# Patient Record
Sex: MALE | Race: WHITE | URBAN - METROPOLITAN AREA
[De-identification: names, ages, dates, MRNs, and addresses within clinical notes are randomized per-mention and may not be internally consistent; named-entity substitution may affect disease eponyms.]

---

## 2020-09-30 ENCOUNTER — OFFICE VISIT (OUTPATIENT)
Dept: URBAN - METROPOLITAN AREA CLINIC 54 | Facility: CLINIC | Age: 70
End: 2020-09-30
Payer: COMMERCIAL

## 2020-09-30 DIAGNOSIS — K21.9 GERD (GASTROESOPHAGEAL REFLUX DISEASE): ICD-10-CM

## 2020-09-30 PROCEDURE — G8420 CALC BMI NORM PARAMETERS: HCPCS | Performed by: INTERNAL MEDICINE

## 2020-09-30 PROCEDURE — 1036F TOBACCO NON-USER: CPT | Performed by: INTERNAL MEDICINE

## 2020-09-30 PROCEDURE — G8427 DOCREV CUR MEDS BY ELIG CLIN: HCPCS | Performed by: INTERNAL MEDICINE

## 2020-09-30 PROCEDURE — 3017F COLORECTAL CA SCREEN DOC REV: CPT | Performed by: INTERNAL MEDICINE

## 2020-09-30 PROCEDURE — 99203 OFFICE O/P NEW LOW 30 MIN: CPT | Performed by: INTERNAL MEDICINE

## 2020-09-30 RX ORDER — LISINOPRIL 5 MG/1
1 TABLET TABLET ORAL ONCE A DAY
Status: ACTIVE | COMMUNITY

## 2020-09-30 RX ORDER — DEXLANSOPRAZOLE 60 MG/1
1 CAPSULE CAPSULE, DELAYED RELEASE ORAL ONCE A DAY
Status: DISCONTINUED | COMMUNITY

## 2020-09-30 RX ORDER — PANTOPRAZOLE SODIUM 40 MG/1
1 TABLET TABLET, DELAYED RELEASE ORAL BID
Qty: 60 TABLET | Status: ACTIVE | COMMUNITY

## 2020-09-30 RX ORDER — FUROSEMIDE 20 MG/1
1 TABLET TABLET ORAL ONCE A DAY
Status: DISCONTINUED | COMMUNITY

## 2020-09-30 RX ORDER — ONDANSETRON HYDROCHLORIDE 8 MG/1
1 TABLET AS NEEDED TABLET, FILM COATED ORAL ONCE A DAY
Status: DISCONTINUED | COMMUNITY

## 2020-09-30 RX ORDER — ATORVASTATIN CALCIUM 20 MG/1
1 TABLET TABLET, FILM COATED ORAL ONCE A DAY
Status: ACTIVE | COMMUNITY

## 2020-09-30 RX ORDER — LEVOTHYROXINE SODIUM 0.1 MG/1
1 TABLET IN THE MORNING ON AN EMPTY STOMACH TABLET ORAL ONCE A DAY
Qty: 30 TABLET | Status: ACTIVE | COMMUNITY

## 2020-09-30 RX ORDER — PONATINIB HYDROCHLORIDE 45 MG/1
1 TABLET TABLET, FILM COATED ORAL ONCE A DAY
Status: ACTIVE | COMMUNITY

## 2020-09-30 RX ORDER — METOPROLOL TARTRATE 25 MG/1
1 TABLET WITH FOOD TABLET, FILM COATED ORAL TWICE A DAY
Status: ACTIVE | COMMUNITY

## 2020-09-30 RX ORDER — MIRTAZAPINE 15 MG/1
1 TABLET AT BEDTIME TABLET, FILM COATED ORAL ONCE A DAY
Status: ACTIVE | COMMUNITY

## 2020-09-30 RX ORDER — FAMOTIDINE 20 MG/1
1 TABLET AT BEDTIME AS NEEDED TABLET, FILM COATED ORAL ONCE A DAY
Status: DISCONTINUED | COMMUNITY

## 2020-09-30 RX ORDER — FEXOFENADINE HYDROCHLORIDE 180 MG/1
1 TABLET TABLET, FILM COATED ORAL ONCE A DAY
Status: DISCONTINUED | COMMUNITY

## 2020-09-30 NOTE — PHYSICAL EXAM SKIN:
no rashes , no suspicious lesions , no areas of discoloration , no jaundice present , good turgor , no masses , no tenderness on palpation. Dry scaly skin throughout , sees dermatology

## 2020-09-30 NOTE — HPI-TODAY'S VISIT:
This is a 70-year-old male referred by Dr. Hernando Perdomo for reflux.  He has had several months of nighttime regurgitation sometimes with liquid on the pillow he awakens with coughing as a result.  He has not suffered aspiration pneumonia.  He is unaware of heartburn occurring during the day.  For the last several months he has been on pantoprazole twice a day.  There is no abdominal pain.  He has mild constipation for which he takes milk of magnesia.  There is been no GI bleeding or loss of weight.  Is been no excessive gaining weight.  He uses only a small amount of coffee, he does not use peppermints.  There is no dysphagia.  He has had no recent endoscopic exams.  He had a fundoplication in 1998 with good results.  He has never had a colonoscopy and refuses one now. He has CML and is on regular chemotherapy for this keeping the WBC fairly low currently 3200 with 50% neutrophils.  He has mild thrombocytopenia with 100,000 platelets.  He has no active cardiac or respiratory issues.

## 2020-10-12 ENCOUNTER — CLAIMS CREATED FROM THE CLAIM WINDOW (OUTPATIENT)
Dept: URBAN - METROPOLITAN AREA CLINIC 4 | Facility: CLINIC | Age: 70
End: 2020-10-12
Payer: COMMERCIAL

## 2020-10-12 ENCOUNTER — OFFICE VISIT (OUTPATIENT)
Dept: URBAN - METROPOLITAN AREA SURGERY CENTER 14 | Facility: SURGERY CENTER | Age: 70
End: 2020-10-12
Payer: COMMERCIAL

## 2020-10-12 DIAGNOSIS — R12 BURNING REFLUX: ICD-10-CM

## 2020-10-12 DIAGNOSIS — K29.60 OTHER GASTRITIS WITHOUT BLEEDING: ICD-10-CM

## 2020-10-12 DIAGNOSIS — K31.89 ACQUIRED DEFORMITY OF DUODENUM: ICD-10-CM

## 2020-10-12 PROCEDURE — G8907 PT DOC NO EVENTS ON DISCHARG: HCPCS | Performed by: INTERNAL MEDICINE

## 2020-10-12 PROCEDURE — 88305 TISSUE EXAM BY PATHOLOGIST: CPT | Performed by: PATHOLOGY

## 2020-10-12 PROCEDURE — 43239 EGD BIOPSY SINGLE/MULTIPLE: CPT | Performed by: INTERNAL MEDICINE

## 2020-10-12 PROCEDURE — 88312 SPECIAL STAINS GROUP 1: CPT | Performed by: PATHOLOGY

## 2020-11-18 ENCOUNTER — OFFICE VISIT (OUTPATIENT)
Dept: URBAN - METROPOLITAN AREA CLINIC 54 | Facility: CLINIC | Age: 70
End: 2020-11-18

## 2020-11-30 ENCOUNTER — OFFICE VISIT (OUTPATIENT)
Dept: URBAN - METROPOLITAN AREA CLINIC 54 | Facility: CLINIC | Age: 70
End: 2020-11-30
Payer: COMMERCIAL

## 2020-11-30 DIAGNOSIS — R93.5 ABNORMAL CT OF THE ABDOMEN: ICD-10-CM

## 2020-11-30 PROCEDURE — G9903 PT SCRN TBCO ID AS NON USER: HCPCS | Performed by: INTERNAL MEDICINE

## 2020-11-30 PROCEDURE — 3017F COLORECTAL CA SCREEN DOC REV: CPT | Performed by: INTERNAL MEDICINE

## 2020-11-30 PROCEDURE — G8427 DOCREV CUR MEDS BY ELIG CLIN: HCPCS | Performed by: INTERNAL MEDICINE

## 2020-11-30 PROCEDURE — G8420 CALC BMI NORM PARAMETERS: HCPCS | Performed by: INTERNAL MEDICINE

## 2020-11-30 PROCEDURE — 99214 OFFICE O/P EST MOD 30 MIN: CPT | Performed by: INTERNAL MEDICINE

## 2020-11-30 PROCEDURE — G8482 FLU IMMUNIZE ORDER/ADMIN: HCPCS | Performed by: INTERNAL MEDICINE

## 2020-11-30 RX ORDER — POLYETHYLENE GLYCOL 3350, SODIUM SULFATE, SODIUM CHLORIDE, POTASSIUM CHLORIDE, ASCORBIC ACID, SODIUM ASCORBATE 140-9-5.2G
AS DIRECTED KIT ORAL
Qty: 1 | OUTPATIENT
Start: 2020-11-30 | End: 2020-12-01

## 2020-11-30 RX ORDER — LISINOPRIL 5 MG/1
1 TABLET TABLET ORAL ONCE A DAY
Status: ACTIVE | COMMUNITY

## 2020-11-30 RX ORDER — PONATINIB HYDROCHLORIDE 45 MG/1
1 TABLET TABLET, FILM COATED ORAL ONCE A DAY
Status: ACTIVE | COMMUNITY

## 2020-11-30 RX ORDER — ATORVASTATIN CALCIUM 20 MG/1
1 TABLET TABLET, FILM COATED ORAL ONCE A DAY
Status: ACTIVE | COMMUNITY

## 2020-11-30 RX ORDER — METOPROLOL TARTRATE 25 MG/1
1 TABLET WITH FOOD TABLET, FILM COATED ORAL TWICE A DAY
Status: ACTIVE | COMMUNITY

## 2020-11-30 RX ORDER — PANTOPRAZOLE SODIUM 40 MG/1
1 TABLET TABLET, DELAYED RELEASE ORAL BID
Qty: 60 TABLET | Status: ACTIVE | COMMUNITY

## 2020-11-30 RX ORDER — MIRTAZAPINE 15 MG/1
1 TABLET AT BEDTIME TABLET, FILM COATED ORAL ONCE A DAY
Status: ACTIVE | COMMUNITY

## 2020-11-30 RX ORDER — LEVOTHYROXINE SODIUM 0.1 MG/1
1 TABLET IN THE MORNING ON AN EMPTY STOMACH TABLET ORAL ONCE A DAY
Qty: 30 TABLET | Status: ACTIVE | COMMUNITY

## 2020-11-30 NOTE — HPI-TODAY'S VISIT:
70-year-old man seen in follow-up from recent hospitalization for typhlitis with fever.  He currently feels well he presented with fever fatigue and weakness.  He had no abdominal pain, diarrhea or rectal bleeding.  He had previously been offered colonoscopy but declined.  He would like to proceed at this time.  He currently feels well.  His chronic leukemia is under control with medication.  His reflux symptoms are under control with twice daily PPI.  He has no dysphagia.

## 2020-11-30 NOTE — HPI-OTHER HISTORIES
>>Recent hospital visit  HISTORY OF PRESENT ILLNESS: Patient is a 70-year-old male non-smoker with a past medical history of chronic myeloid leukemia (on chemo pill daily), anemia, Hypertension, Hyperlipidemia, stage 3 CKD, A-fib, and GERD who presents to the ED with complaints of a fever that began around 0630 this morning. The symptoms have been moderate (104.9 degrees) and constant since onset. Upon reviewing prior records, the patient was evaluated here yesterday for fatigue where it was noted that he was dehydrated and was given fluids. There are no alleviating or exacerbating factors. Patient reports some wheezing that began last night along with fatigue, however denies any abdominal pain, nausea, vomiting, diarrhea, cough, congestion, or chest pain.    Consults: General surgery.   Brief hospital course: Patient admitted to the hospital with the above history.  Seen by surgeon diagnosed with typhlitis. Started on antibiotic treatment as recommended by surgery. Patient started on IV fluid hydration on admission, patient had dilutional effect with resultant drop in the WBC, H&H and platelets count.  Patient was advised to follow-up with his hematologist oncologist as soon as possible within the next week

## 2021-01-25 ENCOUNTER — OFFICE VISIT (OUTPATIENT)
Dept: URBAN - METROPOLITAN AREA SURGERY CENTER 14 | Facility: SURGERY CENTER | Age: 71
End: 2021-01-25

## 2021-09-08 ENCOUNTER — TELEPHONE ENCOUNTER (OUTPATIENT)
Dept: URBAN - METROPOLITAN AREA CLINIC 54 | Facility: CLINIC | Age: 71
End: 2021-09-08

## 2021-09-20 ENCOUNTER — OFFICE VISIT (OUTPATIENT)
Dept: URBAN - METROPOLITAN AREA CLINIC 54 | Facility: CLINIC | Age: 71
End: 2021-09-20

## 2021-09-23 ENCOUNTER — OFFICE VISIT (OUTPATIENT)
Dept: URBAN - METROPOLITAN AREA CLINIC 54 | Facility: CLINIC | Age: 71
End: 2021-09-23
Payer: COMMERCIAL

## 2021-09-23 ENCOUNTER — OFFICE VISIT (OUTPATIENT)
Dept: URBAN - METROPOLITAN AREA CLINIC 54 | Facility: CLINIC | Age: 71
End: 2021-09-23

## 2021-09-23 ENCOUNTER — WEB ENCOUNTER (OUTPATIENT)
Dept: URBAN - METROPOLITAN AREA CLINIC 54 | Facility: CLINIC | Age: 71
End: 2021-09-23

## 2021-09-23 VITALS
SYSTOLIC BLOOD PRESSURE: 108 MMHG | TEMPERATURE: 97.2 F | HEART RATE: 79 BPM | WEIGHT: 149.8 LBS | HEIGHT: 66 IN | BODY MASS INDEX: 24.08 KG/M2 | DIASTOLIC BLOOD PRESSURE: 77 MMHG

## 2021-09-23 DIAGNOSIS — K21.9 GERD (GASTROESOPHAGEAL REFLUX DISEASE): ICD-10-CM

## 2021-09-23 DIAGNOSIS — R93.3 ABNORMAL CT SCAN, COLON: ICD-10-CM

## 2021-09-23 DIAGNOSIS — R19.7 DIARRHEA, UNSPECIFIED TYPE: ICD-10-CM

## 2021-09-23 PROCEDURE — 99213 OFFICE O/P EST LOW 20 MIN: CPT | Performed by: INTERNAL MEDICINE

## 2021-09-23 RX ORDER — METOPROLOL TARTRATE 25 MG/1
1 TABLET WITH FOOD TABLET, FILM COATED ORAL TWICE A DAY
Status: ACTIVE | COMMUNITY

## 2021-09-23 RX ORDER — PONATINIB HYDROCHLORIDE 45 MG/1
1 TABLET TABLET, FILM COATED ORAL ONCE A DAY
Status: ACTIVE | COMMUNITY

## 2021-09-23 RX ORDER — PANTOPRAZOLE SODIUM 40 MG/1
1 TABLET TABLET, DELAYED RELEASE ORAL BID
Qty: 60 TABLET | Status: ACTIVE | COMMUNITY

## 2021-09-23 RX ORDER — SODIUM SULFATE, MAGNESIUM SULFATE, AND POTASSIUM CHLORIDE 17.75; 2.7; 2.25 G/1; G/1; G/1
12 TABLETS TABLET ORAL
Qty: 24 TABLET | Refills: 0 | OUTPATIENT
Start: 2021-09-23 | End: 2021-09-24

## 2021-09-23 RX ORDER — MIRTAZAPINE 15 MG/1
1 TABLET AT BEDTIME TABLET, FILM COATED ORAL ONCE A DAY
Status: ACTIVE | COMMUNITY

## 2021-09-23 RX ORDER — ATORVASTATIN CALCIUM 20 MG/1
1 TABLET TABLET, FILM COATED ORAL ONCE A DAY
Status: ACTIVE | COMMUNITY

## 2021-09-23 RX ORDER — LISINOPRIL 5 MG/1
1 TABLET TABLET ORAL ONCE A DAY
Status: ACTIVE | COMMUNITY

## 2021-09-23 RX ORDER — LEVOTHYROXINE SODIUM 0.1 MG/1
1 TABLET IN THE MORNING ON AN EMPTY STOMACH TABLET ORAL ONCE A DAY
Qty: 30 TABLET | Status: ACTIVE | COMMUNITY

## 2021-09-23 NOTE — PHYSICAL EXAM GASTROINTESTINAL
Abdomen , soft, nontender, nondistended , no guarding or rigidity , no masses palpable , normal bowel sounds , Liver and Spleen , no hepatomegaly present , no hepatosplenomegaly , liver nontender , spleen not palpable , Rectal , external exam only. Diffuse perianal erythema, no mass or ulceration.

## 2021-09-23 NOTE — HPI-TODAY'S VISIT:
71-year-old male with recent diarrhea for 3 weeks which has resolved.  There is no abdominal pain or nausea.  He is on Iclusig (a tyrosine kinase inhibitor) for CML.  He has periodic leukopenia.  A recent stool C. difficile was negative.  He had been given metronidazole on 1 of 2 recent ER visits.  He has now had no bowel movements in several days.  A CT scan that showed some persistent inflammation in the cecum and periappendiceal area.  He feels that he is not well enough to undergo colonoscopy at this time.  An upper endoscopy earlier this year was unremarkable.  He has an appointment with his hematologist again tomorrow.  He has flaky skin and significant perianal irritation from diarrhea.  His weight is remained about the same.  He feels very weak.

## 2021-09-29 LAB
ADENOVIRUS F 40/41: NOT DETECTED
ASTROVIRUS: NOT DETECTED
C DIFFICILE TOXIN A/B: NOT DETECTED
CAMPYLOBACTER: NOT DETECTED
CRYPTOSPORIDIUM: NOT DETECTED
CYCLOSPORA CAYETANENSIS: NOT DETECTED
E COLI O157: (no result)
ENTAMOEBA HISTOLYTICA: NOT DETECTED
ENTEROAGGREGATIVE E COLI: NOT DETECTED
ENTEROPATHOGENIC E COLI: NOT DETECTED
ENTEROTOXIGENIC E COLI: NOT DETECTED
GIARDIA LAMBLIA: NOT DETECTED
NOROVIRUS GI/GII: NOT DETECTED
PLESIOMONAS SHIGELLOIDES: NOT DETECTED
ROTAVIRUS A: NOT DETECTED
SALMONELLA: NOT DETECTED
SAPOVIRUS: NOT DETECTED
SHIGA-TOXIN-PRODUCING E COLI: NOT DETECTED
SHIGELLA/ENTEROINVASIVE E COLI: NOT DETECTED
VIBRIO CHOLERAE: NOT DETECTED
VIBRIO: NOT DETECTED
YERSINIA ENTEROCOLITICA: NOT DETECTED

## 2021-09-30 ENCOUNTER — TELEPHONE ENCOUNTER (OUTPATIENT)
Dept: URBAN - METROPOLITAN AREA CLINIC 54 | Facility: CLINIC | Age: 71
End: 2021-09-30

## 2021-10-05 ENCOUNTER — TELEPHONE ENCOUNTER (OUTPATIENT)
Dept: URBAN - METROPOLITAN AREA CLINIC 54 | Facility: CLINIC | Age: 71
End: 2021-10-05

## 2021-10-05 RX ORDER — LEVOTHYROXINE SODIUM 0.1 MG/1
1 TABLET IN THE MORNING ON AN EMPTY STOMACH TABLET ORAL ONCE A DAY
Qty: 30 TABLET | Status: ACTIVE | COMMUNITY

## 2021-10-05 RX ORDER — PANTOPRAZOLE SODIUM 40 MG/1
1 TABLET TABLET, DELAYED RELEASE ORAL BID
Qty: 60 TABLET | Status: ACTIVE | COMMUNITY

## 2021-10-05 RX ORDER — LISINOPRIL 5 MG/1
1 TABLET TABLET ORAL ONCE A DAY
Status: ACTIVE | COMMUNITY

## 2021-10-05 RX ORDER — MIRTAZAPINE 15 MG/1
1 TABLET AT BEDTIME TABLET, FILM COATED ORAL ONCE A DAY
Status: ACTIVE | COMMUNITY

## 2021-10-05 RX ORDER — PONATINIB HYDROCHLORIDE 45 MG/1
1 TABLET TABLET, FILM COATED ORAL ONCE A DAY
Status: ACTIVE | COMMUNITY

## 2021-10-05 RX ORDER — METOPROLOL TARTRATE 25 MG/1
1 TABLET WITH FOOD TABLET, FILM COATED ORAL TWICE A DAY
Status: ACTIVE | COMMUNITY

## 2021-10-05 RX ORDER — ONDANSETRON 4 MG/1
1 TABLET ON THE TONGUE AND ALLOW TO DISSOLVE TABLET, ORALLY DISINTEGRATING ORAL TWICE A DAY
Qty: 30 | OUTPATIENT
Start: 2021-10-06

## 2021-10-05 RX ORDER — ATORVASTATIN CALCIUM 20 MG/1
1 TABLET TABLET, FILM COATED ORAL ONCE A DAY
Status: ACTIVE | COMMUNITY

## 2021-10-12 ENCOUNTER — TELEPHONE ENCOUNTER (OUTPATIENT)
Dept: URBAN - METROPOLITAN AREA CLINIC 23 | Facility: CLINIC | Age: 71
End: 2021-10-12

## 2021-10-29 ENCOUNTER — OFFICE VISIT (OUTPATIENT)
Dept: URBAN - METROPOLITAN AREA SURGERY CENTER 14 | Facility: SURGERY CENTER | Age: 71
End: 2021-10-29

## 2021-11-03 ENCOUNTER — TELEPHONE ENCOUNTER (OUTPATIENT)
Dept: URBAN - METROPOLITAN AREA CLINIC 54 | Facility: CLINIC | Age: 71
End: 2021-11-03

## 2021-11-11 ENCOUNTER — OUT OF OFFICE VISIT (OUTPATIENT)
Dept: URBAN - METROPOLITAN AREA MEDICAL CENTER 23 | Facility: MEDICAL CENTER | Age: 71
End: 2021-11-11
Payer: COMMERCIAL

## 2021-11-11 DIAGNOSIS — K74.60 ADVANCED CIRRHOSIS: ICD-10-CM

## 2021-11-11 DIAGNOSIS — I48.0 INTERMITTENT ATRIAL FIBRILLATION: ICD-10-CM

## 2021-11-11 DIAGNOSIS — I31.3 PERICARDIAL EFFUSION: ICD-10-CM

## 2021-11-11 DIAGNOSIS — R18.8 ABDOMINAL ASCITES: ICD-10-CM

## 2021-11-11 PROCEDURE — 99215 OFFICE O/P EST HI 40 MIN: CPT | Performed by: INTERNAL MEDICINE

## 2021-11-15 ENCOUNTER — LAB OUTSIDE AN ENCOUNTER (OUTPATIENT)
Dept: URBAN - METROPOLITAN AREA CLINIC 54 | Facility: CLINIC | Age: 71
End: 2021-11-15

## 2021-11-15 ENCOUNTER — OFFICE VISIT (OUTPATIENT)
Dept: URBAN - METROPOLITAN AREA CLINIC 54 | Facility: CLINIC | Age: 71
End: 2021-11-15
Payer: COMMERCIAL

## 2021-11-15 VITALS
DIASTOLIC BLOOD PRESSURE: 68 MMHG | SYSTOLIC BLOOD PRESSURE: 105 MMHG | HEIGHT: 66 IN | WEIGHT: 129.6 LBS | BODY MASS INDEX: 20.83 KG/M2 | HEART RATE: 57 BPM | TEMPERATURE: 98 F

## 2021-11-15 DIAGNOSIS — R19.7 DIARRHEA, UNSPECIFIED TYPE: ICD-10-CM

## 2021-11-15 DIAGNOSIS — R93.3 ABNORMAL CT SCAN, COLON: ICD-10-CM

## 2021-11-15 DIAGNOSIS — R18.8 OTHER ASCITES: ICD-10-CM

## 2021-11-15 DIAGNOSIS — K21.9 GERD (GASTROESOPHAGEAL REFLUX DISEASE): ICD-10-CM

## 2021-11-15 PROCEDURE — 99214 OFFICE O/P EST MOD 30 MIN: CPT | Performed by: INTERNAL MEDICINE

## 2021-11-15 RX ORDER — METOPROLOL TARTRATE 25 MG/1
1 TABLET WITH FOOD TABLET, FILM COATED ORAL TWICE A DAY
Status: ACTIVE | COMMUNITY

## 2021-11-15 RX ORDER — LEVOTHYROXINE SODIUM 0.1 MG/1
1 TABLET IN THE MORNING ON AN EMPTY STOMACH TABLET ORAL ONCE A DAY
Qty: 30 TABLET | Status: ACTIVE | COMMUNITY

## 2021-11-15 RX ORDER — LISINOPRIL 5 MG/1
1 TABLET TABLET ORAL ONCE A DAY
Status: ACTIVE | COMMUNITY

## 2021-11-15 RX ORDER — MIRTAZAPINE 15 MG/1
1 TABLET AT BEDTIME TABLET, FILM COATED ORAL ONCE A DAY
Status: ACTIVE | COMMUNITY

## 2021-11-15 RX ORDER — ONDANSETRON 4 MG/1
1 TABLET ON THE TONGUE AND ALLOW TO DISSOLVE TABLET, ORALLY DISINTEGRATING ORAL TWICE A DAY
Qty: 30 | Status: ACTIVE | COMMUNITY
Start: 2021-10-06

## 2021-11-15 RX ORDER — PONATINIB HYDROCHLORIDE 45 MG/1
1 TABLET TABLET, FILM COATED ORAL ONCE A DAY
Status: ACTIVE | COMMUNITY

## 2021-11-15 RX ORDER — ATORVASTATIN CALCIUM 20 MG/1
1 TABLET TABLET, FILM COATED ORAL ONCE A DAY
Status: ACTIVE | COMMUNITY

## 2021-11-15 RX ORDER — FUROSEMIDE 40 MG/1
1 TABLET TABLET ORAL ONCE A DAY
Status: ACTIVE | COMMUNITY

## 2021-11-15 RX ORDER — PANTOPRAZOLE SODIUM 40 MG/1
1 TABLET TABLET, DELAYED RELEASE ORAL BID
Qty: 60 TABLET | Status: ACTIVE | COMMUNITY

## 2021-11-15 RX ORDER — SPIRONOLACTONE 50 MG/1
1 TABLET TABLET, FILM COATED ORAL ONCE A DAY
Status: ACTIVE | COMMUNITY

## 2021-11-15 NOTE — HPI-TODAY'S VISIT:
71-year-old male recently discharged from hospital with new onset ascites.  He has no prior history of chronic liver disease alcohol use or hepatitis.  He has marked fatigue.  He denies abdominal pain.  There is no peripheral edema.  He has CML and has been on Iclusig daily.  He is apparently in remission at this time.  He has undergone paracentesis with a SAG ratio of greater than 1.1 and no SBP.  Antinuclear antibody and antimitochondrial antibodies and hepatitis serologies have been negative.  Iron study was normal last year.  He also presented with shortness of breath but no chest pain.  Several months ago he had an abnormal CT scan of the colon possibly typhlitis and is been treated with antibiotics.  Stool C. difficile has been negative.  He has had mild intermittent diarrhea but no bleeding.  He had an upper endoscopy earlier this year that was unremarkable with no varices he follows up regularly with a hematologist.  He has had significant problems with dermatitis that has been related to the CML and to treatment.

## 2021-11-15 NOTE — PHYSICAL EXAM CONSTITUTIONAL:
well developed, well nourished , in no acute distress , ambulating without difficulty , normal communication ability. No asterixis. Fatigued.

## 2021-11-15 NOTE — PHYSICAL EXAM GASTROINTESTINAL
Abdomen , soft, nontender, mildly distended , no guarding or rigidity , no masses palpable , normal bowel sounds , Liver and Spleen , no hepatomegaly present , no hepatosplenomegaly , liver nontender , spleen not palpable

## 2021-11-16 ENCOUNTER — TELEPHONE ENCOUNTER (OUTPATIENT)
Dept: URBAN - METROPOLITAN AREA CLINIC 54 | Facility: CLINIC | Age: 71
End: 2021-11-16

## 2021-11-22 ENCOUNTER — TELEPHONE ENCOUNTER (OUTPATIENT)
Dept: URBAN - METROPOLITAN AREA CLINIC 23 | Facility: CLINIC | Age: 71
End: 2021-11-22

## 2021-11-29 ENCOUNTER — LAB OUTSIDE AN ENCOUNTER (OUTPATIENT)
Dept: URBAN - METROPOLITAN AREA CLINIC 54 | Facility: CLINIC | Age: 71
End: 2021-11-29

## 2021-12-13 ENCOUNTER — LAB OUTSIDE AN ENCOUNTER (OUTPATIENT)
Dept: URBAN - METROPOLITAN AREA CLINIC 54 | Facility: CLINIC | Age: 71
End: 2021-12-13

## 2021-12-16 ENCOUNTER — OFFICE VISIT (OUTPATIENT)
Dept: URBAN - METROPOLITAN AREA CLINIC 54 | Facility: CLINIC | Age: 71
End: 2021-12-16

## 2021-12-27 ENCOUNTER — LAB OUTSIDE AN ENCOUNTER (OUTPATIENT)
Dept: URBAN - METROPOLITAN AREA CLINIC 54 | Facility: CLINIC | Age: 71
End: 2021-12-27

## 2022-01-03 ENCOUNTER — TELEPHONE ENCOUNTER (OUTPATIENT)
Dept: URBAN - METROPOLITAN AREA CLINIC 92 | Facility: CLINIC | Age: 72
End: 2022-01-03

## 2022-01-03 ENCOUNTER — OFFICE VISIT (OUTPATIENT)
Dept: URBAN - METROPOLITAN AREA CLINIC 54 | Facility: CLINIC | Age: 72
End: 2022-01-03
Payer: COMMERCIAL

## 2022-01-03 VITALS
TEMPERATURE: 98.3 F | BODY MASS INDEX: 18.8 KG/M2 | SYSTOLIC BLOOD PRESSURE: 135 MMHG | WEIGHT: 117 LBS | DIASTOLIC BLOOD PRESSURE: 83 MMHG | HEART RATE: 56 BPM | HEIGHT: 66 IN

## 2022-01-03 DIAGNOSIS — R18.8 OTHER ASCITES: ICD-10-CM

## 2022-01-03 DIAGNOSIS — K74.60 UNSPECIFIED CIRRHOSIS OF LIVER: ICD-10-CM

## 2022-01-03 DIAGNOSIS — E43 SEVERE PROTEIN-CALORIE MALNUTRITION: ICD-10-CM

## 2022-01-03 DIAGNOSIS — C92.10 CML (CHRONIC MYELOCYTIC LEUKEMIA): ICD-10-CM

## 2022-01-03 DIAGNOSIS — R19.7 DIARRHEA, UNSPECIFIED TYPE: ICD-10-CM

## 2022-01-03 DIAGNOSIS — K21.9 GERD (GASTROESOPHAGEAL REFLUX DISEASE): ICD-10-CM

## 2022-01-03 DIAGNOSIS — K76.9 LIVER LESION: ICD-10-CM

## 2022-01-03 DIAGNOSIS — K76.6 PORTAL HYPERTENSION: ICD-10-CM

## 2022-01-03 DIAGNOSIS — R93.3 ABNORMAL CT SCAN, COLON: ICD-10-CM

## 2022-01-03 PROCEDURE — 99214 OFFICE O/P EST MOD 30 MIN: CPT | Performed by: INTERNAL MEDICINE

## 2022-01-03 RX ORDER — PONATINIB HYDROCHLORIDE 45 MG/1
1 TABLET TABLET, FILM COATED ORAL ONCE A DAY
Status: ACTIVE | COMMUNITY

## 2022-01-03 RX ORDER — MIRTAZAPINE 15 MG/1
1 TABLET AT BEDTIME TABLET, FILM COATED ORAL ONCE A DAY
Status: ACTIVE | COMMUNITY

## 2022-01-03 RX ORDER — ATORVASTATIN CALCIUM 20 MG/1
1 TABLET TABLET, FILM COATED ORAL ONCE A DAY
Status: ACTIVE | COMMUNITY

## 2022-01-03 RX ORDER — SPIRONOLACTONE 50 MG/1
1 TABLET TABLET, FILM COATED ORAL BID
Status: ACTIVE | COMMUNITY

## 2022-01-03 RX ORDER — LISINOPRIL 5 MG/1
1 TABLET TABLET ORAL ONCE A DAY
Status: ACTIVE | COMMUNITY

## 2022-01-03 RX ORDER — METOPROLOL TARTRATE 25 MG/1
1 TABLET WITH FOOD TABLET, FILM COATED ORAL TWICE A DAY
Status: ACTIVE | COMMUNITY

## 2022-01-03 RX ORDER — LEVOTHYROXINE SODIUM 0.1 MG/1
1 TABLET IN THE MORNING ON AN EMPTY STOMACH TABLET ORAL ONCE A DAY
Qty: 30 TABLET | Status: ACTIVE | COMMUNITY

## 2022-01-03 RX ORDER — ONDANSETRON 4 MG/1
1 TABLET ON THE TONGUE AND ALLOW TO DISSOLVE TABLET, ORALLY DISINTEGRATING ORAL TWICE A DAY
Qty: 30 | Status: ACTIVE | COMMUNITY
Start: 2021-10-06

## 2022-01-03 RX ORDER — FUROSEMIDE 40 MG/1
1 TABLET TABLET ORAL ONCE A DAY
Status: ACTIVE | COMMUNITY

## 2022-01-03 RX ORDER — PANTOPRAZOLE SODIUM 40 MG/1
1 TABLET TABLET, DELAYED RELEASE ORAL BID
Qty: 60 TABLET | Status: ACTIVE | COMMUNITY

## 2022-01-03 NOTE — PHYSICAL EXAM CONSTITUTIONAL:
Chronically ill appearing, malnourished, in no acute distress , ambulating without difficulty , normal communication ability. No asterixis. fatigued.

## 2022-01-03 NOTE — HPI-TODAY'S VISIT:
71-year-old male recently discharged from hospital with new onset ascites.  He has no prior history of chronic liver disease alcohol use or hepatitis.  He has marked fatigue.  He denies abdominal pain.  There is no peripheral edema.  He has CML and has been on Iclusig daily.  He is apparently in remission at this time.  He has undergone paracentesis with a SAG ratio of greater than 1.1 and no SBP.  Antinuclear antibody and antimitochondrial antibodies and hepatitis serologies have been negative.  Iron study was normal last year.  He also presented with shortness of breath but no chest pain.  Several months ago he had an abnormal CT scan of the colon possibly typhlitis and is been treated with antibiotics.  Stool C. difficile has been negative.  He has had mild intermittent diarrhea but no bleeding.  He had an upper endoscopy earlier this year that was unremarkable with no varices he follows up regularly with a hematologist.  He has had significant problems with dermatitis that has been related to the CML and to treatment.  Follow Up 1/3/22: Patient presents for liver related opinion. He was diagnosed with Cirrhosis in 2021. He has portal hypertension manifested as ascites. No varices or PSE. He was requiring LVP's but has responded well to A100 and L40 with good results. He is on < 2 gm Na diet. He has lost 30 lbs over past 1 year. He has HLD and HTN but no other elements of metabolic syndrome. Viral markers have been negative. He is on chemo for CML. Portal duplex negative for vascular obstruction. CT shows a segment 5 poorly described lesion with capsular retraction.

## 2022-01-10 ENCOUNTER — LAB OUTSIDE AN ENCOUNTER (OUTPATIENT)
Dept: URBAN - METROPOLITAN AREA CLINIC 54 | Facility: CLINIC | Age: 72
End: 2022-01-10

## 2022-01-10 ENCOUNTER — TELEPHONE ENCOUNTER (OUTPATIENT)
Dept: URBAN - METROPOLITAN AREA CLINIC 54 | Facility: CLINIC | Age: 72
End: 2022-01-10

## 2022-01-18 ENCOUNTER — LAB OUTSIDE AN ENCOUNTER (OUTPATIENT)
Dept: URBAN - METROPOLITAN AREA CLINIC 54 | Facility: CLINIC | Age: 72
End: 2022-01-18

## 2022-01-18 LAB
CREATININE POC: 1.6
PERFORMING LAB: (no result)

## 2022-01-19 ENCOUNTER — TELEPHONE ENCOUNTER (OUTPATIENT)
Dept: URBAN - METROPOLITAN AREA CLINIC 54 | Facility: CLINIC | Age: 72
End: 2022-01-19

## 2022-01-19 ENCOUNTER — TELEPHONE ENCOUNTER (OUTPATIENT)
Dept: URBAN - METROPOLITAN AREA CLINIC 92 | Facility: CLINIC | Age: 72
End: 2022-01-19

## 2022-01-20 ENCOUNTER — LAB OUTSIDE AN ENCOUNTER (OUTPATIENT)
Dept: URBAN - METROPOLITAN AREA CLINIC 54 | Facility: CLINIC | Age: 72
End: 2022-01-20

## 2022-01-22 LAB
AFP, SERUM, TUMOR MARKER: 4.4
ALPHA 2-MACROGLOBULINS, QN: 141
ALT (SGPT) P5P: 65
APOLIPOPROTEIN A-1: 130
AST (SGOT) P5P: 38
BILIRUBIN, TOTAL: 0.2
BUN/CREATININE RATIO: 25
BUN: 34
CA 19-9: 23
CARBON DIOXIDE, TOTAL: 21
CHLORIDE: 109
CHOLESTEROL, TOTAL: 133
COMMENT:: (no result)
CREATININE: 1.34
EGFR IF AFRICN AM: 61
EGFR IF NONAFRICN AM: 53
FIBROSIS SCORE: 0.22
FIBROSIS SCORING:: (no result)
FIBROSIS STAGE: (no result)
GGT: 175
GLUCOSE, SERUM: 89
GLUCOSE: 92
HAPTOGLOBIN: 191
HEIGHT:: 66
INTERPRETATIONS:: (no result)
LIMITATIONS:: (no result)
NASH GRADE: (no result)
NASH SCORE: 0.25
NASH SCORING: (no result)
POTASSIUM: 4.3
SODIUM: 142
STEATOSIS GRADE: (no result)
STEATOSIS GRADING: (no result)
STEATOSIS SCORE: 0.69
TRIGLYCERIDES: 131
WEIGHT:: 129

## 2022-01-24 ENCOUNTER — LAB OUTSIDE AN ENCOUNTER (OUTPATIENT)
Dept: URBAN - METROPOLITAN AREA CLINIC 54 | Facility: CLINIC | Age: 72
End: 2022-01-24

## 2022-02-07 ENCOUNTER — LAB OUTSIDE AN ENCOUNTER (OUTPATIENT)
Dept: URBAN - METROPOLITAN AREA CLINIC 54 | Facility: CLINIC | Age: 72
End: 2022-02-07

## 2022-02-16 LAB
AFP, SERUM, TUMOR MARKER: 3.3
BUN/CREATININE RATIO: 18
BUN: 18
CA 19-9: 17
CARBON DIOXIDE, TOTAL: 22
CHLORIDE: 108
CREATININE: 1
EGFR IF AFRICN AM: 87
EGFR IF NONAFRICN AM: 75
GLUCOSE: 93
POTASSIUM: 4
SODIUM: 145

## 2022-02-18 ENCOUNTER — TELEPHONE ENCOUNTER (OUTPATIENT)
Dept: URBAN - METROPOLITAN AREA CLINIC 54 | Facility: CLINIC | Age: 72
End: 2022-02-18

## 2022-03-04 ENCOUNTER — OFFICE VISIT (OUTPATIENT)
Dept: URBAN - METROPOLITAN AREA CLINIC 54 | Facility: CLINIC | Age: 72
End: 2022-03-04
Payer: COMMERCIAL

## 2022-03-04 VITALS
DIASTOLIC BLOOD PRESSURE: 81 MMHG | HEART RATE: 49 BPM | SYSTOLIC BLOOD PRESSURE: 135 MMHG | TEMPERATURE: 97.2 F | HEIGHT: 66 IN | BODY MASS INDEX: 20.89 KG/M2 | WEIGHT: 130 LBS

## 2022-03-04 DIAGNOSIS — E43 SEVERE PROTEIN-CALORIE MALNUTRITION: ICD-10-CM

## 2022-03-04 DIAGNOSIS — K76.6 PORTAL HYPERTENSION: ICD-10-CM

## 2022-03-04 DIAGNOSIS — R93.3 ABNORMAL CT SCAN, COLON: ICD-10-CM

## 2022-03-04 DIAGNOSIS — R18.8 OTHER ASCITES: ICD-10-CM

## 2022-03-04 DIAGNOSIS — K21.9 GERD (GASTROESOPHAGEAL REFLUX DISEASE): ICD-10-CM

## 2022-03-04 DIAGNOSIS — C92.10 CML (CHRONIC MYELOCYTIC LEUKEMIA): ICD-10-CM

## 2022-03-04 DIAGNOSIS — K74.60 UNSPECIFIED CIRRHOSIS OF LIVER: ICD-10-CM

## 2022-03-04 DIAGNOSIS — K76.9 LIVER LESION: ICD-10-CM

## 2022-03-04 DIAGNOSIS — R19.7 DIARRHEA, UNSPECIFIED TYPE: ICD-10-CM

## 2022-03-04 PROCEDURE — 99214 OFFICE O/P EST MOD 30 MIN: CPT | Performed by: INTERNAL MEDICINE

## 2022-03-04 RX ORDER — PONATINIB HYDROCHLORIDE 45 MG/1
1 TABLET TABLET, FILM COATED ORAL ONCE A DAY
Status: ON HOLD | COMMUNITY

## 2022-03-04 RX ORDER — LISINOPRIL 5 MG/1
1 TABLET TABLET ORAL ONCE A DAY
Status: ACTIVE | COMMUNITY

## 2022-03-04 RX ORDER — FUROSEMIDE 40 MG/1
1 TABLET TABLET ORAL ONCE A DAY
Status: ACTIVE | COMMUNITY

## 2022-03-04 RX ORDER — LEVOTHYROXINE SODIUM 0.1 MG/1
1 TABLET IN THE MORNING ON AN EMPTY STOMACH TABLET ORAL ONCE A DAY
Qty: 30 TABLET | Status: ACTIVE | COMMUNITY

## 2022-03-04 RX ORDER — SPIRONOLACTONE 50 MG/1
1 TABLET TABLET, FILM COATED ORAL BID
Status: ACTIVE | COMMUNITY

## 2022-03-04 RX ORDER — FUROSEMIDE 20 MG/1
1 TABLET TABLET ORAL ONCE A DAY
Qty: 30 | Refills: 2 | OUTPATIENT
Start: 2022-03-04

## 2022-03-04 RX ORDER — ONDANSETRON 4 MG/1
1 TABLET ON THE TONGUE AND ALLOW TO DISSOLVE TABLET, ORALLY DISINTEGRATING ORAL TWICE A DAY
Qty: 30 | Status: ON HOLD | COMMUNITY
Start: 2021-10-06

## 2022-03-04 RX ORDER — ATORVASTATIN CALCIUM 20 MG/1
1 TABLET TABLET, FILM COATED ORAL ONCE A DAY
Status: ACTIVE | COMMUNITY

## 2022-03-04 RX ORDER — METOPROLOL TARTRATE 25 MG/1
1 TABLET WITH FOOD TABLET, FILM COATED ORAL TWICE A DAY
Status: ACTIVE | COMMUNITY

## 2022-03-04 RX ORDER — MIRTAZAPINE 15 MG/1
1 TABLET AT BEDTIME TABLET, FILM COATED ORAL ONCE A DAY
Status: ACTIVE | COMMUNITY

## 2022-03-04 RX ORDER — PANTOPRAZOLE SODIUM 40 MG/1
1 TABLET TABLET, DELAYED RELEASE ORAL BID
Qty: 60 TABLET | Status: ACTIVE | COMMUNITY

## 2022-03-04 NOTE — HPI-TODAY'S VISIT:
71-year-old male recently discharged from hospital with new onset ascites.  He has no prior history of chronic liver disease alcohol use or hepatitis.  He has marked fatigue.  He denies abdominal pain.  There is no peripheral edema.  He has CML and has been on Iclusig daily.  He is apparently in remission at this time.  He has undergone paracentesis with a SAG ratio of greater than 1.1 and no SBP.  Antinuclear antibody and antimitochondrial antibodies and hepatitis serologies have been negative.  Iron study was normal last year.  He also presented with shortness of breath but no chest pain.  Several months ago he had an abnormal CT scan of the colon possibly typhlitis and is been treated with antibiotics.  Stool C. difficile has been negative.  He has had mild intermittent diarrhea but no bleeding.  He had an upper endoscopy earlier this year that was unremarkable with no varices he follows up regularly with a hematologist.  He has had significant problems with dermatitis that has been related to the CML and to treatment.  Follow Up 1/3/22: Patient presents for liver related opinion. He was diagnosed with Cirrhosis in 2021. He has portal hypertension manifested as ascites. No varices or PSE. He was requiring LVP's but has responded well to A100 and L40 with good results. He is on < 2 gm Na diet. He has lost 30 lbs over past 1 year. He has HLD and HTN but no other elements of metabolic syndrome. Viral markers have been negative. He is on chemo for CML. Portal duplex negative for vascular obstruction. CT shows a segment 5 poorly described lesion with capsular retraction.  Follow Up 3/4/22: Patient presents for routine follow up. He feels better. Ascites is better controlled on A50 L 40. His Aldactone 100 but cut down on patient request. He has followed up with his Oncologist at [place] and remains in Iclusig. MRI showed 1.6 cm indeterminate lesion in Seg 8. AFP and CA 19-9 normal. He has has gained 13 lbs with protein supplements. Appetite and sleep and good. He saw Urology for urinary retention.

## 2022-03-07 ENCOUNTER — LAB OUTSIDE AN ENCOUNTER (OUTPATIENT)
Dept: URBAN - METROPOLITAN AREA CLINIC 54 | Facility: CLINIC | Age: 72
End: 2022-03-07

## 2022-04-04 ENCOUNTER — LAB OUTSIDE AN ENCOUNTER (OUTPATIENT)
Dept: URBAN - METROPOLITAN AREA CLINIC 54 | Facility: CLINIC | Age: 72
End: 2022-04-04

## 2022-04-18 ENCOUNTER — TELEPHONE ENCOUNTER (OUTPATIENT)
Dept: URBAN - METROPOLITAN AREA CLINIC 92 | Facility: CLINIC | Age: 72
End: 2022-04-18

## 2022-04-20 ENCOUNTER — TELEPHONE ENCOUNTER (OUTPATIENT)
Dept: URBAN - METROPOLITAN AREA CLINIC 54 | Facility: CLINIC | Age: 72
End: 2022-04-20

## 2022-05-02 ENCOUNTER — LAB OUTSIDE AN ENCOUNTER (OUTPATIENT)
Dept: URBAN - METROPOLITAN AREA CLINIC 54 | Facility: CLINIC | Age: 72
End: 2022-05-02

## 2022-05-04 ENCOUNTER — LAB OUTSIDE AN ENCOUNTER (OUTPATIENT)
Dept: URBAN - METROPOLITAN AREA CLINIC 54 | Facility: CLINIC | Age: 72
End: 2022-05-04

## 2022-05-16 ENCOUNTER — OFFICE VISIT (OUTPATIENT)
Dept: URBAN - METROPOLITAN AREA CLINIC 54 | Facility: CLINIC | Age: 72
End: 2022-05-16

## 2022-05-16 RX ORDER — METOPROLOL TARTRATE 25 MG/1
1 TABLET WITH FOOD TABLET, FILM COATED ORAL TWICE A DAY
COMMUNITY

## 2022-05-16 RX ORDER — FUROSEMIDE 20 MG/1
1 TABLET TABLET ORAL ONCE A DAY
Qty: 30 | Refills: 2 | COMMUNITY
Start: 2022-03-04

## 2022-05-16 RX ORDER — PANTOPRAZOLE SODIUM 40 MG/1
1 TABLET TABLET, DELAYED RELEASE ORAL BID
Qty: 60 TABLET | COMMUNITY

## 2022-05-16 RX ORDER — LEVOTHYROXINE SODIUM 0.1 MG/1
1 TABLET IN THE MORNING ON AN EMPTY STOMACH TABLET ORAL ONCE A DAY
Qty: 30 TABLET | COMMUNITY

## 2022-05-16 RX ORDER — MIRTAZAPINE 15 MG/1
1 TABLET AT BEDTIME TABLET, FILM COATED ORAL ONCE A DAY
COMMUNITY

## 2022-05-16 RX ORDER — ONDANSETRON 4 MG/1
1 TABLET ON THE TONGUE AND ALLOW TO DISSOLVE TABLET, ORALLY DISINTEGRATING ORAL TWICE A DAY
Qty: 30 | COMMUNITY
Start: 2021-10-06

## 2022-05-16 RX ORDER — PONATINIB HYDROCHLORIDE 45 MG/1
1 TABLET TABLET, FILM COATED ORAL ONCE A DAY
COMMUNITY

## 2022-05-16 RX ORDER — ATORVASTATIN CALCIUM 20 MG/1
1 TABLET TABLET, FILM COATED ORAL ONCE A DAY
COMMUNITY

## 2022-05-16 RX ORDER — LISINOPRIL 5 MG/1
1 TABLET TABLET ORAL ONCE A DAY
COMMUNITY

## 2022-05-16 RX ORDER — FUROSEMIDE 40 MG/1
1 TABLET TABLET ORAL ONCE A DAY
COMMUNITY

## 2022-05-16 RX ORDER — SPIRONOLACTONE 50 MG/1
1 TABLET TABLET, FILM COATED ORAL BID
COMMUNITY

## 2022-05-18 ENCOUNTER — LAB OUTSIDE AN ENCOUNTER (OUTPATIENT)
Dept: URBAN - METROPOLITAN AREA CLINIC 54 | Facility: CLINIC | Age: 72
End: 2022-05-18

## 2022-05-26 ENCOUNTER — OFFICE VISIT (OUTPATIENT)
Dept: URBAN - METROPOLITAN AREA CLINIC 54 | Facility: CLINIC | Age: 72
End: 2022-05-26

## 2022-06-01 ENCOUNTER — LAB OUTSIDE AN ENCOUNTER (OUTPATIENT)
Dept: URBAN - METROPOLITAN AREA CLINIC 54 | Facility: CLINIC | Age: 72
End: 2022-06-01

## 2022-06-10 ENCOUNTER — OFFICE VISIT (OUTPATIENT)
Dept: URBAN - METROPOLITAN AREA CLINIC 54 | Facility: CLINIC | Age: 72
End: 2022-06-10
Payer: COMMERCIAL

## 2022-06-10 VITALS
SYSTOLIC BLOOD PRESSURE: 132 MMHG | HEART RATE: 57 BPM | TEMPERATURE: 97.4 F | HEIGHT: 66 IN | DIASTOLIC BLOOD PRESSURE: 77 MMHG | RESPIRATION RATE: 16 BRPM | BODY MASS INDEX: 22.34 KG/M2 | WEIGHT: 139 LBS

## 2022-06-10 DIAGNOSIS — C92.10 CML (CHRONIC MYELOCYTIC LEUKEMIA): ICD-10-CM

## 2022-06-10 DIAGNOSIS — R18.8 OTHER ASCITES: ICD-10-CM

## 2022-06-10 DIAGNOSIS — K76.6 PORTAL HYPERTENSION: ICD-10-CM

## 2022-06-10 DIAGNOSIS — K21.9 GERD (GASTROESOPHAGEAL REFLUX DISEASE): ICD-10-CM

## 2022-06-10 DIAGNOSIS — K76.9 LIVER LESION: ICD-10-CM

## 2022-06-10 DIAGNOSIS — E43 SEVERE PROTEIN-CALORIE MALNUTRITION: ICD-10-CM

## 2022-06-10 DIAGNOSIS — K74.60 UNSPECIFIED CIRRHOSIS OF LIVER: ICD-10-CM

## 2022-06-10 PROCEDURE — 99214 OFFICE O/P EST MOD 30 MIN: CPT

## 2022-06-10 RX ORDER — LISINOPRIL 5 MG/1
1 TABLET TABLET ORAL ONCE A DAY
Status: ACTIVE | COMMUNITY

## 2022-06-10 RX ORDER — SPIRONOLACTONE 50 MG/1
1 TABLET TABLET, FILM COATED ORAL BID
Status: ACTIVE | COMMUNITY

## 2022-06-10 RX ORDER — PANTOPRAZOLE SODIUM 40 MG/1
1 TABLET TABLET, DELAYED RELEASE ORAL BID
Qty: 60 TABLET | Status: ACTIVE | COMMUNITY

## 2022-06-10 RX ORDER — ATORVASTATIN CALCIUM 20 MG/1
1 TABLET TABLET, FILM COATED ORAL ONCE A DAY
Status: ACTIVE | COMMUNITY

## 2022-06-10 RX ORDER — FUROSEMIDE 40 MG/1
1 TABLET TABLET ORAL ONCE A DAY
Status: ACTIVE | COMMUNITY

## 2022-06-10 RX ORDER — METOPROLOL TARTRATE 25 MG/1
1 TABLET WITH FOOD TABLET, FILM COATED ORAL TWICE A DAY
Status: ACTIVE | COMMUNITY

## 2022-06-10 RX ORDER — PONATINIB HYDROCHLORIDE 45 MG/1
1 TABLET TABLET, FILM COATED ORAL ONCE A DAY
Status: ACTIVE | COMMUNITY

## 2022-06-10 RX ORDER — ONDANSETRON 4 MG/1
1 TABLET ON THE TONGUE AND ALLOW TO DISSOLVE TABLET, ORALLY DISINTEGRATING ORAL TWICE A DAY
Qty: 30 | Status: ACTIVE | COMMUNITY
Start: 2021-10-06

## 2022-06-10 RX ORDER — LEVOTHYROXINE SODIUM 0.1 MG/1
1 TABLET IN THE MORNING ON AN EMPTY STOMACH TABLET ORAL ONCE A DAY
Qty: 30 TABLET | Status: ACTIVE | COMMUNITY

## 2022-06-10 RX ORDER — APIXABAN 5 MG/1
AS DIRECTED TABLET, FILM COATED ORAL
Status: ACTIVE | COMMUNITY

## 2022-06-10 RX ORDER — MIRTAZAPINE 15 MG/1
1 TABLET AT BEDTIME TABLET, FILM COATED ORAL ONCE A DAY
Status: ACTIVE | COMMUNITY

## 2022-06-10 NOTE — HPI-TODAY'S VISIT:
71-year-old male recently discharged from hospital with new onset ascites.  He has no prior history of chronic liver disease alcohol use or hepatitis.  He has marked fatigue.  He denies abdominal pain.  There is no peripheral edema.  He has CML and has been on Iclusig daily.  He is apparently in remission at this time.  He has undergone paracentesis with a SAG ratio of greater than 1.1 and no SBP.  Antinuclear antibody and antimitochondrial antibodies and hepatitis serologies have been negative.  Iron study was normal last year.  He also presented with shortness of breath but no chest pain.  Several months ago he had an abnormal CT scan of the colon possibly typhlitis and is been treated with antibiotics.  Stool C. difficile has been negative.  He has had mild intermittent diarrhea but no bleeding.  He had an upper endoscopy earlier this year that was unremarkable with no varices he follows up regularly with a hematologist.  He has had significant problems with dermatitis that has been related to the CML and to treatment.  Follow Up 1/3/22: Patient presents for liver related opinion. He was diagnosed with Cirrhosis in 2021. He has portal hypertension manifested as ascites. No varices or PSE. He was requiring LVP's but has responded well to A100 and L40 with good results. He is on < 2 gm Na diet. He has lost 30 lbs over past 1 year. He has HLD and HTN but no other elements of metabolic syndrome. Viral markers have been negative. He is on chemo for CML. Portal duplex negative for vascular obstruction. CT shows a segment 5 poorly described lesion with capsular retraction.  Follow Up 3/4/22: Patient presents for routine follow up. He feels better. Ascites is better controlled on A50 L 40. His Aldactone 100 but cut down on patient request. He has followed up with his Oncologist at [place] and remains in Iclusig. MRI showed 1.6 cm indeterminate lesion in Seg 8. AFP and CA 19-9 normal. He has has gained 13 lbs with protein supplements. Appetite and sleep and good. He saw Urology for urinary retention.  Follow Up 6/10/22: Pt presents for hospital f/u. Pt presented to [facility] ED on 4/16 with ascites and 3 days of SOB. Pt underwent a paracentesis without 3700 cc of fluid removed. Pt was discharged the same day and standing order for paracentesis every 2 weeks prn was placed. Pt had paracentesis with removal of 3500 ml of serosanguineous appearing fluid on 4/21. Today pt is feeling well overall without complaints. Denies worsening ascites, jaundice, PSE, coagulopathy. Pt's oncologist took him off Iclusig a month ago. Last EV screen was unremarkable in 10/2020. Pt is due for repeat MRI for HCC screen. MELD-Na on 4/16 was 14. MRI 1/18/22: 1.6 x 1.5 cm lesion seen posterior laterally in the peripheral aspect of segment 8 which could be a hemangioma although metastatic lesion cannot be exluded. Interval development of mild bilateral hydronephrosis. Splenomegaly. Interval decrease in amount of ascites.

## 2022-06-15 ENCOUNTER — LAB OUTSIDE AN ENCOUNTER (OUTPATIENT)
Dept: URBAN - METROPOLITAN AREA CLINIC 54 | Facility: CLINIC | Age: 72
End: 2022-06-15

## 2022-06-20 ENCOUNTER — LAB OUTSIDE AN ENCOUNTER (OUTPATIENT)
Dept: URBAN - METROPOLITAN AREA CLINIC 54 | Facility: CLINIC | Age: 72
End: 2022-06-20

## 2022-06-20 LAB
CREATININE POC: 1.2
PERFORMING LAB: (no result)

## 2022-06-22 ENCOUNTER — TELEPHONE ENCOUNTER (OUTPATIENT)
Dept: URBAN - METROPOLITAN AREA CLINIC 54 | Facility: CLINIC | Age: 72
End: 2022-06-22

## 2022-06-23 ENCOUNTER — TELEPHONE ENCOUNTER (OUTPATIENT)
Dept: URBAN - METROPOLITAN AREA CLINIC 54 | Facility: CLINIC | Age: 72
End: 2022-06-23

## 2022-06-27 ENCOUNTER — TELEPHONE ENCOUNTER (OUTPATIENT)
Dept: URBAN - METROPOLITAN AREA CLINIC 54 | Facility: CLINIC | Age: 72
End: 2022-06-27

## 2022-06-29 ENCOUNTER — LAB OUTSIDE AN ENCOUNTER (OUTPATIENT)
Dept: URBAN - METROPOLITAN AREA CLINIC 54 | Facility: CLINIC | Age: 72
End: 2022-06-29

## 2022-06-30 ENCOUNTER — TELEPHONE ENCOUNTER (OUTPATIENT)
Dept: URBAN - METROPOLITAN AREA CLINIC 92 | Facility: CLINIC | Age: 72
End: 2022-06-30

## 2022-06-30 ENCOUNTER — TELEPHONE ENCOUNTER (OUTPATIENT)
Dept: URBAN - METROPOLITAN AREA CLINIC 54 | Facility: CLINIC | Age: 72
End: 2022-06-30

## 2022-07-01 ENCOUNTER — LAB OUTSIDE AN ENCOUNTER (OUTPATIENT)
Dept: URBAN - METROPOLITAN AREA CLINIC 54 | Facility: CLINIC | Age: 72
End: 2022-07-01

## 2022-07-13 ENCOUNTER — LAB OUTSIDE AN ENCOUNTER (OUTPATIENT)
Dept: URBAN - METROPOLITAN AREA CLINIC 54 | Facility: CLINIC | Age: 72
End: 2022-07-13

## 2022-07-27 ENCOUNTER — LAB OUTSIDE AN ENCOUNTER (OUTPATIENT)
Dept: URBAN - METROPOLITAN AREA CLINIC 54 | Facility: CLINIC | Age: 72
End: 2022-07-27

## 2022-08-05 ENCOUNTER — TELEPHONE ENCOUNTER (OUTPATIENT)
Dept: URBAN - METROPOLITAN AREA CLINIC 92 | Facility: CLINIC | Age: 72
End: 2022-08-05

## 2022-08-10 ENCOUNTER — LAB OUTSIDE AN ENCOUNTER (OUTPATIENT)
Dept: URBAN - METROPOLITAN AREA CLINIC 54 | Facility: CLINIC | Age: 72
End: 2022-08-10

## 2022-08-24 ENCOUNTER — LAB OUTSIDE AN ENCOUNTER (OUTPATIENT)
Dept: URBAN - METROPOLITAN AREA CLINIC 54 | Facility: CLINIC | Age: 72
End: 2022-08-24

## 2022-09-07 ENCOUNTER — LAB OUTSIDE AN ENCOUNTER (OUTPATIENT)
Dept: URBAN - METROPOLITAN AREA CLINIC 54 | Facility: CLINIC | Age: 72
End: 2022-09-07

## 2022-09-12 ENCOUNTER — OFFICE VISIT (OUTPATIENT)
Dept: URBAN - METROPOLITAN AREA CLINIC 54 | Facility: CLINIC | Age: 72
End: 2022-09-12
Payer: COMMERCIAL

## 2022-09-12 VITALS
BODY MASS INDEX: 22.98 KG/M2 | SYSTOLIC BLOOD PRESSURE: 115 MMHG | HEIGHT: 66 IN | TEMPERATURE: 98.1 F | HEART RATE: 58 BPM | WEIGHT: 143 LBS | DIASTOLIC BLOOD PRESSURE: 66 MMHG

## 2022-09-12 DIAGNOSIS — K76.6 PORTAL HYPERTENSION: ICD-10-CM

## 2022-09-12 DIAGNOSIS — C92.10 CML (CHRONIC MYELOCYTIC LEUKEMIA): ICD-10-CM

## 2022-09-12 DIAGNOSIS — K74.60 UNSPECIFIED CIRRHOSIS OF LIVER: ICD-10-CM

## 2022-09-12 DIAGNOSIS — K76.9 LIVER LESION: ICD-10-CM

## 2022-09-12 DIAGNOSIS — K21.9 GERD (GASTROESOPHAGEAL REFLUX DISEASE): ICD-10-CM

## 2022-09-12 DIAGNOSIS — E43 SEVERE PROTEIN-CALORIE MALNUTRITION: ICD-10-CM

## 2022-09-12 DIAGNOSIS — R18.8 OTHER ASCITES: ICD-10-CM

## 2022-09-12 PROBLEM — 235595009 GASTROESOPHAGEAL REFLUX DISEASE: Status: ACTIVE | Noted: 2020-09-30

## 2022-09-12 PROBLEM — 34742003: Status: ACTIVE | Noted: 2022-01-03

## 2022-09-12 PROBLEM — 238107002: Status: ACTIVE | Noted: 2022-01-03

## 2022-09-12 PROBLEM — 19943007: Status: ACTIVE | Noted: 2022-01-03

## 2022-09-12 PROBLEM — 300331000: Status: ACTIVE | Noted: 2022-01-03

## 2022-09-12 PROCEDURE — 99214 OFFICE O/P EST MOD 30 MIN: CPT | Performed by: INTERNAL MEDICINE

## 2022-09-12 RX ORDER — PANTOPRAZOLE SODIUM 40 MG/1
1 TABLET TABLET, DELAYED RELEASE ORAL BID
Qty: 60 TABLET | Status: ACTIVE | COMMUNITY

## 2022-09-12 RX ORDER — ATORVASTATIN CALCIUM 20 MG/1
1 TABLET TABLET, FILM COATED ORAL ONCE A DAY
Status: ACTIVE | COMMUNITY

## 2022-09-12 RX ORDER — MIRTAZAPINE 15 MG/1
1 TABLET AT BEDTIME TABLET, FILM COATED ORAL ONCE A DAY
Status: ACTIVE | COMMUNITY

## 2022-09-12 RX ORDER — TAMSULOSIN HYDROCHLORIDE 0.4 MG/1
1 CAPSULE CAPSULE ORAL ONCE A DAY
Status: ACTIVE | COMMUNITY

## 2022-09-12 RX ORDER — LEVOTHYROXINE SODIUM 0.1 MG/1
1 TABLET IN THE MORNING ON AN EMPTY STOMACH TABLET ORAL ONCE A DAY
Qty: 30 TABLET | Status: ACTIVE | COMMUNITY

## 2022-09-12 RX ORDER — LISINOPRIL 5 MG/1
1 TABLET TABLET ORAL ONCE A DAY
Status: ACTIVE | COMMUNITY

## 2022-09-12 RX ORDER — APIXABAN 2.5 MG/1
AS DIRECTED TABLET, FILM COATED ORAL
Status: ACTIVE | COMMUNITY

## 2022-09-12 RX ORDER — METOPROLOL TARTRATE 25 MG/1
0.5 TABLET WITH FOOD TABLET, FILM COATED ORAL TWICE A DAY
Status: ACTIVE | COMMUNITY

## 2022-09-12 NOTE — HPI-TODAY'S VISIT:
71-year-old male recently discharged from hospital with new onset ascites.  He has no prior history of chronic liver disease alcohol use or hepatitis.  He has marked fatigue.  He denies abdominal pain.  There is no peripheral edema.  He has CML and has been on Iclusig daily.  He is apparently in remission at this time.  He has undergone paracentesis with a SAG ratio of greater than 1.1 and no SBP.  Antinuclear antibody and antimitochondrial antibodies and hepatitis serologies have been negative.  Iron study was normal last year.  He also presented with shortness of breath but no chest pain.  Several months ago he had an abnormal CT scan of the colon possibly typhlitis and is been treated with antibiotics.  Stool C. difficile has been negative.  He has had mild intermittent diarrhea but no bleeding.  He had an upper endoscopy earlier this year that was unremarkable with no varices he follows up regularly with a hematologist.  He has had significant problems with dermatitis that has been related to the CML and to treatment.  Follow Up 1/3/22: Patient presents for liver related opinion. He was diagnosed with Cirrhosis in 2021. He has portal hypertension manifested as ascites. No varices or PSE. He was requiring LVP's but has responded well to A100 and L40 with good results. He is on < 2 gm Na diet. He has lost 30 lbs over past 1 year. He has HLD and HTN but no other elements of metabolic syndrome. Viral markers have been negative. He is on chemo for CML. Portal duplex negative for vascular obstruction. CT shows a segment 5 poorly described lesion with capsular retraction.  Follow Up 3/4/22: Patient presents for routine follow up. He feels better. Ascites is better controlled on A50 L 40. His Aldactone 100 but cut down on patient request. He has followed up with his Oncologist at [place] and remains in Iclusig. MRI showed 1.6 cm indeterminate lesion in Seg 8. AFP and CA 19-9 normal. He has has gained 13 lbs with protein supplements. Appetite and sleep and good. He saw Urology for urinary retention.  Follow Up 6/10/22: Pt presents for hospital f/u. Pt presented to [facility] ED on 4/16 with ascites and 3 days of SOB. Pt underwent a paracentesis without 3700 cc of fluid removed. Pt was discharged the same day and standing order for paracentesis every 2 weeks prn was placed. Pt had paracentesis with removal of 3500 ml of serosanguineous appearing fluid on 4/21. Today pt is feeling well overall without complaints. Denies worsening ascites, jaundice, PSE, coagulopathy. Pt's oncologist took him off Iclusig a month ago. Last EV screen was unremarkable in 10/2020. Pt is due for repeat MRI for HCC screen. MELD-Na on 4/16 was 14. MRI 1/18/22: 1.6 x 1.5 cm lesion seen posterior laterally in the peripheral aspect of segment 8 which could be a hemangioma although metastatic lesion cannot be exluded. Interval development of mild bilateral hydronephrosis. Splenomegaly. Interval decrease in amount of ascites.  Follow Up 9/12/22: Patient presents for routine follow up. He stopped Iclusig 2 months ago with significant improved QoL. He has stopped his diuretics with no further taps. MRI June 2022 showed stable 1.7 cm right lobe lesion. No new complaints. Appetite and sleep is OK on Ambien.

## 2022-09-13 LAB
A/G RATIO: 1.5
ABSOLUTE BAND NEUTROPHILS: (no result)
ABSOLUTE BASOPHILS: 5070
ABSOLUTE BLASTS: 2028
ABSOLUTE EOSINOPHILS: 2028
ABSOLUTE LYMPHOCYTES: 5070
ABSOLUTE METAMYELOCYTES: 2028
ABSOLUTE MONOCYTES: 2028
ABSOLUTE MYELOCYTES: (no result)
ABSOLUTE NEUTROPHILS: (no result)
ABSOLUTE PROMYELOCYTES: 3042
AFP, SERUM, TUMOR MARKER: 2.4
ALBUMIN: 4
ALKALINE PHOSPHATASE: 220
ALT (SGPT): 19
AST (SGOT): 25
BAND NEUTROPHILS: 15
BASOPHILS: 5
BILIRUBIN, TOTAL: 0.4
BLASTS: 2
BUN/CREATININE RATIO: 20
BUN: 26
CALCIUM: 9.4
CARBON DIOXIDE, TOTAL: 27
CHLORIDE: 106
COMMENT(S): (no result)
CREATININE: 1.31
EGFR: 57
EOSINOPHILS: 2
GLOBULIN, TOTAL: 2.7
GLUCOSE: 96
HEMATOCRIT: 31.6
HEMOGLOBIN: 10.7
INR: 1.1
LYMPHOCYTES: 5
MCH: 31.8
MCHC: 33.9
MCV: 94
METAMYELOCYTES: 2
MONOCYTES: 2
MPV: 9.4
MYELOCYTES: 19
NEUTROPHILS: 45
PLATELET COUNT: 319
POTASSIUM: 4.1
PROMYELOCYTES: 3
PROTEIN, TOTAL: 6.7
PT: 11.3
RDW: 17.4
RED BLOOD CELL COUNT: 3.36
SODIUM: 143
WHITE BLOOD CELL COUNT: 101.4

## 2023-01-20 ENCOUNTER — TELEPHONE ENCOUNTER (OUTPATIENT)
Dept: URBAN - NONMETROPOLITAN AREA CLINIC 4 | Facility: CLINIC | Age: 73
End: 2023-01-20

## 2023-01-20 PROBLEM — 389026000: Status: ACTIVE | Noted: 2021-11-15

## 2023-02-03 ENCOUNTER — LAB OUTSIDE AN ENCOUNTER (OUTPATIENT)
Dept: URBAN - NONMETROPOLITAN AREA CLINIC 4 | Facility: CLINIC | Age: 73
End: 2023-02-03

## 2023-02-17 ENCOUNTER — LAB OUTSIDE AN ENCOUNTER (OUTPATIENT)
Dept: URBAN - NONMETROPOLITAN AREA CLINIC 4 | Facility: CLINIC | Age: 73
End: 2023-02-17

## 2023-03-03 ENCOUNTER — OFFICE VISIT (OUTPATIENT)
Dept: URBAN - NONMETROPOLITAN AREA CLINIC 4 | Facility: CLINIC | Age: 73
End: 2023-03-03

## 2023-03-03 ENCOUNTER — LAB OUTSIDE AN ENCOUNTER (OUTPATIENT)
Dept: URBAN - NONMETROPOLITAN AREA CLINIC 4 | Facility: CLINIC | Age: 73
End: 2023-03-03

## 2023-03-03 RX ORDER — APIXABAN 2.5 MG/1
AS DIRECTED TABLET, FILM COATED ORAL
COMMUNITY

## 2023-03-03 RX ORDER — ATORVASTATIN CALCIUM 20 MG/1
1 TABLET TABLET, FILM COATED ORAL ONCE A DAY
COMMUNITY

## 2023-03-03 RX ORDER — TAMSULOSIN HYDROCHLORIDE 0.4 MG/1
1 CAPSULE CAPSULE ORAL ONCE A DAY
COMMUNITY

## 2023-03-03 RX ORDER — PANTOPRAZOLE SODIUM 40 MG/1
1 TABLET TABLET, DELAYED RELEASE ORAL BID
Qty: 60 TABLET | COMMUNITY

## 2023-03-03 RX ORDER — MIRTAZAPINE 15 MG/1
1 TABLET AT BEDTIME TABLET, FILM COATED ORAL ONCE A DAY
COMMUNITY

## 2023-03-03 RX ORDER — LEVOTHYROXINE SODIUM 0.1 MG/1
1 TABLET IN THE MORNING ON AN EMPTY STOMACH TABLET ORAL ONCE A DAY
Qty: 30 TABLET | COMMUNITY

## 2023-03-03 RX ORDER — LISINOPRIL 5 MG/1
1 TABLET TABLET ORAL ONCE A DAY
COMMUNITY

## 2023-03-03 RX ORDER — METOPROLOL TARTRATE 25 MG/1
0.5 TABLET WITH FOOD TABLET, FILM COATED ORAL TWICE A DAY
COMMUNITY

## 2023-03-17 ENCOUNTER — LAB OUTSIDE AN ENCOUNTER (OUTPATIENT)
Dept: URBAN - NONMETROPOLITAN AREA CLINIC 4 | Facility: CLINIC | Age: 73
End: 2023-03-17

## 2023-03-31 ENCOUNTER — LAB OUTSIDE AN ENCOUNTER (OUTPATIENT)
Dept: URBAN - NONMETROPOLITAN AREA CLINIC 4 | Facility: CLINIC | Age: 73
End: 2023-03-31

## 2023-04-14 ENCOUNTER — LAB OUTSIDE AN ENCOUNTER (OUTPATIENT)
Dept: URBAN - NONMETROPOLITAN AREA CLINIC 4 | Facility: CLINIC | Age: 73
End: 2023-04-14

## 2023-04-28 ENCOUNTER — LAB OUTSIDE AN ENCOUNTER (OUTPATIENT)
Dept: URBAN - NONMETROPOLITAN AREA CLINIC 4 | Facility: CLINIC | Age: 73
End: 2023-04-28

## 2023-05-12 ENCOUNTER — LAB OUTSIDE AN ENCOUNTER (OUTPATIENT)
Dept: URBAN - NONMETROPOLITAN AREA CLINIC 4 | Facility: CLINIC | Age: 73
End: 2023-05-12

## 2023-05-15 ENCOUNTER — OFFICE VISIT (OUTPATIENT)
Dept: URBAN - METROPOLITAN AREA CLINIC 54 | Facility: CLINIC | Age: 73
End: 2023-05-15

## 2023-05-15 RX ORDER — LISINOPRIL 5 MG/1
1 TABLET TABLET ORAL ONCE A DAY
Status: ACTIVE | COMMUNITY

## 2023-05-15 RX ORDER — LEVOTHYROXINE SODIUM 125 UG/1
1 TABLET IN THE MORNING ON AN EMPTY STOMACH TABLET ORAL ONCE A DAY
Qty: 30 TABLET | Status: ACTIVE | COMMUNITY

## 2023-05-15 RX ORDER — ATORVASTATIN CALCIUM 20 MG/1
1 TABLET TABLET, FILM COATED ORAL ONCE A DAY
Status: ACTIVE | COMMUNITY

## 2023-05-15 RX ORDER — MIRTAZAPINE 15 MG/1
1 TABLET AT BEDTIME TABLET, FILM COATED ORAL ONCE A DAY
Status: ACTIVE | COMMUNITY

## 2023-05-15 RX ORDER — APIXABAN 2.5 MG/1
AS DIRECTED TABLET, FILM COATED ORAL
Status: ACTIVE | COMMUNITY

## 2023-05-15 RX ORDER — METOPROLOL TARTRATE 25 MG/1
0.5 TABLET WITH FOOD TABLET, FILM COATED ORAL TWICE A DAY
Status: ACTIVE | COMMUNITY

## 2023-05-15 RX ORDER — TAMSULOSIN HYDROCHLORIDE 0.4 MG/1
1 CAPSULE CAPSULE ORAL ONCE A DAY
Status: ACTIVE | COMMUNITY

## 2023-05-15 RX ORDER — PANTOPRAZOLE SODIUM 40 MG/1
1 TABLET TABLET, DELAYED RELEASE ORAL BID
Qty: 60 TABLET | Status: ACTIVE | COMMUNITY

## 2023-05-26 ENCOUNTER — LAB OUTSIDE AN ENCOUNTER (OUTPATIENT)
Dept: URBAN - NONMETROPOLITAN AREA CLINIC 4 | Facility: CLINIC | Age: 73
End: 2023-05-26

## 2023-06-09 ENCOUNTER — LAB OUTSIDE AN ENCOUNTER (OUTPATIENT)
Dept: URBAN - NONMETROPOLITAN AREA CLINIC 4 | Facility: CLINIC | Age: 73
End: 2023-06-09

## 2023-08-23 ENCOUNTER — LAB OUTSIDE AN ENCOUNTER (OUTPATIENT)
Dept: URBAN - METROPOLITAN AREA CLINIC 54 | Facility: CLINIC | Age: 73
End: 2023-08-23

## 2023-08-23 ENCOUNTER — TELEPHONE ENCOUNTER (OUTPATIENT)
Dept: URBAN - METROPOLITAN AREA CLINIC 54 | Facility: CLINIC | Age: 73
End: 2023-08-23

## 2024-01-26 ENCOUNTER — LAB OUTSIDE AN ENCOUNTER (OUTPATIENT)
Dept: URBAN - METROPOLITAN AREA CLINIC 54 | Facility: CLINIC | Age: 74
End: 2024-01-26

## 2024-01-26 ENCOUNTER — OFFICE VISIT (OUTPATIENT)
Dept: URBAN - METROPOLITAN AREA CLINIC 54 | Facility: CLINIC | Age: 74
End: 2024-01-26
Payer: COMMERCIAL

## 2024-01-26 ENCOUNTER — DASHBOARD ENCOUNTERS (OUTPATIENT)
Age: 74
End: 2024-01-26

## 2024-01-26 VITALS
DIASTOLIC BLOOD PRESSURE: 64 MMHG | BODY MASS INDEX: 23.91 KG/M2 | TEMPERATURE: 97.5 F | HEART RATE: 62 BPM | WEIGHT: 148.8 LBS | HEIGHT: 66 IN | SYSTOLIC BLOOD PRESSURE: 76 MMHG

## 2024-01-26 DIAGNOSIS — K76.6 PORTAL HYPERTENSION: ICD-10-CM

## 2024-01-26 DIAGNOSIS — C92.10 CML (CHRONIC MYELOCYTIC LEUKEMIA): ICD-10-CM

## 2024-01-26 DIAGNOSIS — R18.8 OTHER ASCITES: ICD-10-CM

## 2024-01-26 DIAGNOSIS — K74.60 UNSPECIFIED CIRRHOSIS OF LIVER: ICD-10-CM

## 2024-01-26 DIAGNOSIS — K21.9 GERD (GASTROESOPHAGEAL REFLUX DISEASE): ICD-10-CM

## 2024-01-26 DIAGNOSIS — E43 SEVERE PROTEIN-CALORIE MALNUTRITION: ICD-10-CM

## 2024-01-26 DIAGNOSIS — K76.9 LIVER LESION: ICD-10-CM

## 2024-01-26 PROBLEM — 92818009: Status: ACTIVE | Noted: 2022-01-03

## 2024-01-26 PROCEDURE — 99215 OFFICE O/P EST HI 40 MIN: CPT | Performed by: PHYSICIAN ASSISTANT

## 2024-01-26 RX ORDER — APIXABAN 2.5 MG/1
AS DIRECTED TABLET, FILM COATED ORAL
Status: ACTIVE | COMMUNITY

## 2024-01-26 RX ORDER — TAMSULOSIN HYDROCHLORIDE 0.4 MG/1
1 CAPSULE CAPSULE ORAL ONCE A DAY
Status: ACTIVE | COMMUNITY

## 2024-01-26 RX ORDER — PANTOPRAZOLE SODIUM 40 MG/1
1 TABLET TABLET, DELAYED RELEASE ORAL BID
Qty: 60 TABLET | Status: ACTIVE | COMMUNITY

## 2024-01-26 RX ORDER — MIRTAZAPINE 15 MG/1
1 TABLET AT BEDTIME TABLET, FILM COATED ORAL ONCE A DAY
Status: ACTIVE | COMMUNITY

## 2024-01-26 RX ORDER — LISINOPRIL 5 MG/1
1 TABLET TABLET ORAL ONCE A DAY
Status: ACTIVE | COMMUNITY

## 2024-01-26 RX ORDER — LEVOTHYROXINE SODIUM 125 UG/1
1 TABLET IN THE MORNING ON AN EMPTY STOMACH TABLET ORAL ONCE A DAY
Qty: 30 TABLET | Status: ACTIVE | COMMUNITY

## 2024-01-26 RX ORDER — ATORVASTATIN CALCIUM 20 MG/1
1 TABLET TABLET, FILM COATED ORAL ONCE A DAY
Status: ACTIVE | COMMUNITY

## 2024-01-26 RX ORDER — METOPROLOL TARTRATE 25 MG/1
0.5 TABLET WITH FOOD TABLET, FILM COATED ORAL TWICE A DAY
Status: ACTIVE | COMMUNITY

## 2024-01-26 NOTE — PHYSICAL EXAM CONSTITUTIONAL:
well developed, chronically ill appearing, in no acute distress , ambulating without difficulty , normal communication ability

## 2024-01-26 NOTE — HPI-TODAY'S VISIT:
71-year-old male recently discharged from hospital with new onset ascites.  He has no prior history of chronic liver disease alcohol use or hepatitis.  He has marked fatigue.  He denies abdominal pain.  There is no peripheral edema.  He has CML and has been on Iclusig daily.  He is apparently in remission at this time.  He has undergone paracentesis with a SAG ratio of greater than 1.1 and no SBP.  Antinuclear antibody and antimitochondrial antibodies and hepatitis serologies have been negative.  Iron study was normal last year.  He also presented with shortness of breath but no chest pain.  Several months ago he had an abnormal CT scan of the colon possibly typhlitis and is been treated with antibiotics.  Stool C. difficile has been negative.  He has had mild intermittent diarrhea but no bleeding.  He had an upper endoscopy earlier this year that was unremarkable with no varices he follows up regularly with a hematologist.  He has had significant problems with dermatitis that has been related to the CML and to treatment.  Follow Up 1/3/22: Patient presents for liver related opinion. He was diagnosed with Cirrhosis in 2021. He has portal hypertension manifested as ascites. No varices or PSE. He was requiring LVP's but has responded well to A100 and L40 with good results. He is on < 2 gm Na diet. He has lost 30 lbs over past 1 year. He has HLD and HTN but no other elements of metabolic syndrome. Viral markers have been negative. He is on chemo for CML. Portal duplex negative for vascular obstruction. CT shows a segment 5 poorly described lesion with capsular retraction.  Follow Up 3/4/22: Patient presents for routine follow up. He feels better. Ascites is better controlled on A50 L 40. His Aldactone 100 but cut down on patient request. He has followed up with his Oncologist at [place] and remains in Iclusig. MRI showed 1.6 cm indeterminate lesion in Seg 8. AFP and CA 19-9 normal. He has has gained 13 lbs with protein supplements. Appetite and sleep and good. He saw Urology for urinary retention.  Follow Up 6/10/22: Pt presents for hospital f/u. Pt presented to [facility] ED on 4/16 with ascites and 3 days of SOB. Pt underwent a paracentesis without 3700 cc of fluid removed. Pt was discharged the same day and standing order for paracentesis every 2 weeks prn was placed. Pt had paracentesis with removal of 3500 ml of serosanguineous appearing fluid on 4/21. Today pt is feeling well overall without complaints. Denies worsening ascites, jaundice, PSE, coagulopathy. Pt's oncologist took him off Iclusig a month ago. Last EV screen was unremarkable in 10/2020. Pt is due for repeat MRI for HCC screen. MELD-Na on 4/16 was 14. MRI 1/18/22: 1.6 x 1.5 cm lesion seen posterior laterally in the peripheral aspect of segment 8 which could be a hemangioma although metastatic lesion cannot be exluded. Interval development of mild bilateral hydronephrosis. Splenomegaly. Interval decrease in amount of ascites.  Follow Up 9/12/22: Patient presents for routine follow up. He stopped Iclusig 2 months ago with significant improved QoL. He has stopped his diuretics with no further taps. MRI June 2022 showed stable 1.7 cm right lobe lesion. No new complaints. Appetite and sleep is OK on Ambien.  Follow Up 1/26/24: Patient presents for routine follow-up. He was recently seen by is [place] oncology team on 1/22/2024 with updates as follows: - 9/16/2022 Iclusig resumed at mg 45 mg daily - 9/30/2022 Iclusig dose reduced to 30 mg qdaily (due to elevated alkphos) - 10/28/2022 presented w/ thrombocytopenia. Held Ponatinib. -Rising BCR-ABL; 10/28 3.4307% and on 12/2/22 5.5169% - 12/2/22 resume ponatinib at 15 mg qdaily  - Dec 2022 : platelets dropped to 45. Held med for 2 weeks  - Jan 2023 : resumed at 15 mg every other day  Parcentesis orders were placed since last OV but not enough ascites was found to drain.  He states he was requesting paracentesis orders due to shortness of breath when lying back at night. This occured twice, and he thought it was secondary to ascites. Needs HCC screening and EV screening. Denies previous colonoscopy. No longer on high protein diet, but he has maintained weight.

## 2024-01-29 ENCOUNTER — TELEPHONE ENCOUNTER (OUTPATIENT)
Dept: URBAN - METROPOLITAN AREA CLINIC 54 | Facility: CLINIC | Age: 74
End: 2024-01-29

## 2024-01-29 LAB
A/G RATIO: 1.5
ABSOLUTE BASOPHILS: 20
ABSOLUTE EOSINOPHILS: 70
ABSOLUTE LYMPHOCYTES: 486
ABSOLUTE MONOCYTES: 158
ABSOLUTE NEUTROPHILS: 1266
AFP, SERUM, TUMOR MARKER: 3.2
ALBUMIN: 4
ALKALINE PHOSPHATASE: 205
ALT (SGPT): 22
AST (SGOT): 19
BASOPHILS: 1
BILIRUBIN, TOTAL: 0.4
BUN/CREATININE RATIO: (no result)
BUN: 19
CALCIUM: 8.4
CARBON DIOXIDE, TOTAL: 29
CHLORIDE: 106
CREATININE: 1.17
EGFR: 65
EOSINOPHILS: 3.5
GLOBULIN, TOTAL: 2.6
GLUCOSE: 85
HEMATOCRIT: 35.6
HEMOGLOBIN: 12.1
INR: 1.1
LYMPHOCYTES: 24.3
MCH: 33.2
MCHC: 34
MCV: 97.5
MONOCYTES: 7.9
MPV: 10.2
NEUTROPHILS: 63.3
PLATELET COUNT: 66
POTASSIUM: 3.9
PROTEIN, TOTAL: 6.6
PT: 11.3
RDW: 15.3
RED BLOOD CELL COUNT: 3.65
SODIUM: 141
WHITE BLOOD CELL COUNT: 2

## 2024-01-30 ENCOUNTER — TELEPHONE ENCOUNTER (OUTPATIENT)
Dept: URBAN - METROPOLITAN AREA CLINIC 54 | Facility: CLINIC | Age: 74
End: 2024-01-30

## 2024-04-24 ENCOUNTER — EGD (OUTPATIENT)
Dept: URBAN - METROPOLITAN AREA MEDICAL CENTER 24 | Facility: MEDICAL CENTER | Age: 74
End: 2024-04-24

## 2024-07-22 ENCOUNTER — OFFICE VISIT (OUTPATIENT)
Dept: URBAN - METROPOLITAN AREA CLINIC 54 | Facility: CLINIC | Age: 74
End: 2024-07-22